# Patient Record
Sex: MALE | Race: WHITE | NOT HISPANIC OR LATINO | Employment: OTHER | URBAN - NONMETROPOLITAN AREA
[De-identification: names, ages, dates, MRNs, and addresses within clinical notes are randomized per-mention and may not be internally consistent; named-entity substitution may affect disease eponyms.]

---

## 2023-11-30 ENCOUNTER — TELEPHONE (OUTPATIENT)
Dept: CARDIOLOGY | Facility: CLINIC | Age: 73
End: 2023-11-30
Payer: MEDICARE

## 2023-11-30 NOTE — TELEPHONE ENCOUNTER
Patient called to schedule new patient visit with Dr. Clyde Dumont, DO to establish care.  Next opening given for 1/3/23.  Informed patient to use ER and PCP if issues arise.

## 2023-12-20 PROBLEM — M25.562 KNEE PAIN, BILATERAL: Status: ACTIVE | Noted: 2023-12-20

## 2023-12-20 PROBLEM — M17.11 RIGHT KNEE DJD: Status: ACTIVE | Noted: 2023-12-20

## 2023-12-20 PROBLEM — R09.82 POSTNASAL DRIP: Status: ACTIVE | Noted: 2023-12-20

## 2023-12-20 PROBLEM — J32.2 CHRONIC ETHMOIDAL SINUSITIS: Status: ACTIVE | Noted: 2023-12-20

## 2023-12-20 PROBLEM — J34.3 NASAL TURBINATE HYPERTROPHY: Status: ACTIVE | Noted: 2023-12-20

## 2023-12-20 PROBLEM — M25.561 KNEE PAIN, BILATERAL: Status: ACTIVE | Noted: 2023-12-20

## 2023-12-20 PROBLEM — M25.562 LEFT KNEE PAIN: Status: ACTIVE | Noted: 2023-12-20

## 2023-12-20 PROBLEM — M17.12 LEFT KNEE DJD: Status: ACTIVE | Noted: 2023-12-20

## 2023-12-20 PROBLEM — R09.81 NASAL CONGESTION: Status: ACTIVE | Noted: 2023-12-20

## 2023-12-20 RX ORDER — SITAGLIPTIN 100 MG/1
100 TABLET, FILM COATED ORAL
COMMUNITY
Start: 2019-10-16

## 2023-12-20 RX ORDER — LISINOPRIL 10 MG/1
10 TABLET ORAL
COMMUNITY
Start: 2019-08-08

## 2023-12-20 RX ORDER — METFORMIN HYDROCHLORIDE 500 MG/1
1000 TABLET ORAL
COMMUNITY
Start: 2020-04-01

## 2023-12-20 RX ORDER — ALBUTEROL SULFATE 4 MG/1
4 TABLET ORAL
COMMUNITY
Start: 2020-02-18 | End: 2024-01-03

## 2023-12-20 RX ORDER — GLIPIZIDE 5 MG/1
5 TABLET ORAL
COMMUNITY
Start: 2019-11-15 | End: 2024-01-03

## 2023-12-20 RX ORDER — ATORVASTATIN CALCIUM 40 MG/1
40 TABLET, FILM COATED ORAL NIGHTLY
COMMUNITY
Start: 2019-08-08

## 2023-12-20 RX ORDER — TRIAMTERENE AND HYDROCHLOROTHIAZIDE 37.5; 25 MG/1; MG/1
1 CAPSULE ORAL
COMMUNITY
Start: 2019-10-16

## 2023-12-20 RX ORDER — EMPAGLIFLOZIN 25 MG/1
25 TABLET, FILM COATED ORAL DAILY
COMMUNITY
Start: 2019-05-16

## 2023-12-20 RX ORDER — FLUTICASONE PROPIONATE 50 MCG
2 SPRAY, SUSPENSION (ML) NASAL AS NEEDED
COMMUNITY
Start: 2021-07-29

## 2024-01-03 ENCOUNTER — OFFICE VISIT (OUTPATIENT)
Dept: CARDIOLOGY | Facility: CLINIC | Age: 74
End: 2024-01-03
Payer: MEDICARE

## 2024-01-03 VITALS
HEIGHT: 70 IN | SYSTOLIC BLOOD PRESSURE: 122 MMHG | DIASTOLIC BLOOD PRESSURE: 84 MMHG | HEART RATE: 100 BPM | BODY MASS INDEX: 34.36 KG/M2 | WEIGHT: 240 LBS

## 2024-01-03 DIAGNOSIS — R00.0 SINUS TACHYCARDIA: ICD-10-CM

## 2024-01-03 DIAGNOSIS — I10 ESSENTIAL HYPERTENSION: ICD-10-CM

## 2024-01-03 DIAGNOSIS — E11.9 TYPE 2 DIABETES MELLITUS WITHOUT COMPLICATION, WITH LONG-TERM CURRENT USE OF INSULIN (MULTI): ICD-10-CM

## 2024-01-03 DIAGNOSIS — R07.9 CHEST PAIN, UNSPECIFIED TYPE: ICD-10-CM

## 2024-01-03 DIAGNOSIS — Z87.19 HISTORY OF INDIGESTION: ICD-10-CM

## 2024-01-03 DIAGNOSIS — E78.2 MIXED HYPERLIPIDEMIA: ICD-10-CM

## 2024-01-03 DIAGNOSIS — Z79.4 TYPE 2 DIABETES MELLITUS WITHOUT COMPLICATION, WITH LONG-TERM CURRENT USE OF INSULIN (MULTI): ICD-10-CM

## 2024-01-03 DIAGNOSIS — E66.9 OBESITY (BMI 30.0-34.9): ICD-10-CM

## 2024-01-03 PROBLEM — E66.811 OBESITY (BMI 30.0-34.9): Status: ACTIVE | Noted: 2024-01-03

## 2024-01-03 PROCEDURE — 1159F MED LIST DOCD IN RCRD: CPT | Performed by: INTERNAL MEDICINE

## 2024-01-03 PROCEDURE — 93000 ELECTROCARDIOGRAM COMPLETE: CPT | Performed by: INTERNAL MEDICINE

## 2024-01-03 PROCEDURE — 1036F TOBACCO NON-USER: CPT | Performed by: INTERNAL MEDICINE

## 2024-01-03 PROCEDURE — 4010F ACE/ARB THERAPY RXD/TAKEN: CPT | Performed by: INTERNAL MEDICINE

## 2024-01-03 PROCEDURE — 99204 OFFICE O/P NEW MOD 45 MIN: CPT | Performed by: INTERNAL MEDICINE

## 2024-01-03 PROCEDURE — 3074F SYST BP LT 130 MM HG: CPT | Performed by: INTERNAL MEDICINE

## 2024-01-03 PROCEDURE — 1160F RVW MEDS BY RX/DR IN RCRD: CPT | Performed by: INTERNAL MEDICINE

## 2024-01-03 PROCEDURE — 3078F DIAST BP <80 MM HG: CPT | Performed by: INTERNAL MEDICINE

## 2024-01-03 PROCEDURE — 1126F AMNT PAIN NOTED NONE PRSNT: CPT | Performed by: INTERNAL MEDICINE

## 2024-01-03 RX ORDER — GLIPIZIDE 10 MG/1
10 TABLET ORAL
COMMUNITY

## 2024-01-03 RX ORDER — TADALAFIL 20 MG/1
20 TABLET ORAL DAILY PRN
COMMUNITY
End: 2024-04-09 | Stop reason: ALTCHOICE

## 2024-01-03 RX ORDER — ASPIRIN 81 MG/1
81 TABLET ORAL DAILY
COMMUNITY

## 2024-01-03 ASSESSMENT — ENCOUNTER SYMPTOMS: DIZZINESS: 1

## 2024-01-03 NOTE — LETTER
January 3, 2024     Felipe Mcclellan MD  [address]  [place] [address]    Patient: Rico Raman   YOB: 1950   Date of Visit: 1/3/2024       Dear Dr. Felipe Mcclellan MD:    Thank you for referring Rico Raman to me for evaluation. Below are my notes for this consultation.  If you have questions, please do not hesitate to call me. I look forward to following your patient along with you.       Sincerely,     Clyde Dumont, DO      CC: No Recipients  ______________________________________________________________________________________    Cardiology Consultation- New Consult    Reason for referral: 73-year-old gentleman seen in cardiology consultation at the request of himself and Dr. Mcclellan for further evaluation for isolated episode of indigestion/chest discomfort approximately 2 weeks ago that lasted less than a day.  He has had none since.    He denies any previous history of myocardial infarction, revascularization, stroke, thromboembolic or bleeding disorder or hospitalizations.  He has not had any stress testing in the past he denies any syncope or passing out or arrhythmia.    His past medical history is noted for diabetes, hypertension, obesity, history of hip replacement, osteoarthritis of both knees.  In the past year he states he is lost approximately 88 pounds volitionally.    Today's ECG reveals sinus tachycardia rate of 100 but is otherwise normal    Recommendations, obtain lipid panel, counseling on dietary discretion, weight loss and exercise, obtain treadmill stress test if he is able to comply if not then will switch to Lexiscan stress, follow-up for report thereafterwards.    HPI: Rico Raman is a 73 y.o. male       Past Medical History:   1. Diabetes mellitus  2.  Essential hypertension  3.  Obesity  4.  Osteoarthritis  5.  Family history of peripheral vascular disease and diabetes and stroke    Surgical History:   He has a past surgical history that  "includes Hip surgery and Shoulder arthroscopy.    Family History:   Family History   Problem Relation Name Age of Onset   • Diabetes type II Mother     • Other (cancer of prostate) Father     • Skin cancer Sister     • Parkinsonism Brother     • Stroke Brother     • Seizures Brother         Social History:   Social History     Tobacco Use   • Smoking status: Never   • Smokeless tobacco: Never   Substance Use Topics   • Alcohol use: Yes     Comment: rare        Allergies:  Patient has no known allergies.     Current Medications:    Current Outpatient Medications:   •  aspirin 81 mg EC tablet, Take 1 tablet (81 mg) by mouth once daily., Disp: , Rfl:   •  atorvastatin (Lipitor) 40 mg tablet, Take 1 tablet (40 mg) by mouth once daily at bedtime., Disp: , Rfl:   •  fluticasone (Flonase) 50 mcg/actuation nasal spray, Administer 2 sprays into each nostril once daily., Disp: , Rfl:   •  glipiZIDE (Glucotrol) 10 mg tablet, Take 1 tablet (10 mg) by mouth 2 times a day before meals., Disp: , Rfl:   •  Januvia 100 mg tablet, Take 1 tablet (100 mg) by mouth., Disp: , Rfl:   •  Jardiance 25 mg, Take 1 tablet (25 mg) by mouth once daily., Disp: , Rfl:   •  lisinopril 10 mg tablet, Take 1 tablet (10 mg) by mouth., Disp: , Rfl:   •  metFORMIN (Glucophage) 500 mg tablet, Take 2 tablets (1,000 mg) by mouth 2 times a day with meals., Disp: , Rfl:   •  semaglutide (Rybelsus) 3 mg tablet, Take 1 tablet (3 mg) by mouth once daily., Disp: , Rfl:   •  tadalafil 20 mg tablet, Take 1 tablet (20 mg) by mouth once daily as needed for erectile dysfunction., Disp: , Rfl:   •  triamterene-hydrochlorothiazid (Dyazide) 37.5-25 mg capsule, Take 1 capsule by mouth., Disp: , Rfl:      Vitals:  Visit Vitals  /84 (BP Location: Left arm, Patient Position: Sitting)   Pulse 100   Ht 1.765 m (5' 9.5\")   Wt 109 kg (240 lb)   BMI 34.93 kg/m²   Smoking Status Never   BSA 2.31 m²         Review of Systems   Cardiovascular:  Positive for chest pain. "   Neurological:  Positive for dizziness.       Objective        Physical Exam  Constitutional:       Appearance: Normal appearance. He is normal weight.   HENT:      Nose: Nose normal.   Neck:      Vascular: No carotid bruit.   Cardiovascular:      Rate and Rhythm: Tachycardia present.      Pulses: Normal pulses.      Heart sounds: Normal heart sounds.   Pulmonary:      Effort: Pulmonary effort is normal.   Abdominal:      General: Bowel sounds are normal.      Palpations: Abdomen is soft.   Genitourinary:     Rectum: Normal.   Musculoskeletal:         General: Normal range of motion.      Cervical back: Normal range of motion.      Right lower leg: No edema.      Left lower leg: No edema.   Skin:     General: Skin is warm and dry.   Neurological:      General: No focal deficit present.      Mental Status: He is alert.   Psychiatric:         Mood and Affect: Mood normal.         Behavior: Behavior normal.         Thought Content: Thought content normal.         Judgment: Judgment normal.          Scribe Attestation  By signing my name below, DARREL Diana LULY RAMIREZ  , Scribe   attest that this documentation has been prepared under the direction and in the presence of Clyde Dumont DO.     EKG done in office today   Assessment and Plan:   1. Chest pain, unspecified type        2. Sinus tachycardia        3. Essential hypertension        4. Mixed hyperlipidemia        5. Type 2 diabetes mellitus without complication, with long-term current use of insulin (CMS/[facility])        6. History of indigestion        7. Obesity (BMI 30.0-34.9)

## 2024-01-03 NOTE — PROGRESS NOTES
Cardiology Consultation- New Consult    Reason for referral: 73-year-old gentleman seen in cardiology consultation at the request of himself and Dr. Mcclellan for further evaluation for isolated episode of indigestion/chest discomfort approximately 2 weeks ago that lasted less than a day.  He has had none since.    He denies any previous history of myocardial infarction, revascularization, stroke, thromboembolic or bleeding disorder or hospitalizations.  He has not had any stress testing in the past he denies any syncope or passing out or arrhythmia.    His past medical history is noted for diabetes, hypertension, obesity, history of hip replacement, osteoarthritis of both knees.  In the past year he states he is lost approximately 88 pounds volitionally.    Today's ECG reveals sinus tachycardia rate of 100 but is otherwise normal    Recommendations, obtain lipid panel, counseling on dietary discretion, weight loss and exercise, obtain treadmill stress test if he is able to comply if not then will switch to Lexiscan stress, follow-up for report thereafterwards.    HPI: Rico Raman is a 73 y.o. male       Past Medical History:   1. Diabetes mellitus  2.  Essential hypertension  3.  Obesity  4.  Osteoarthritis  5.  Family history of peripheral vascular disease and diabetes and stroke    Surgical History:   He has a past surgical history that includes Hip surgery and Shoulder arthroscopy.    Family History:   Family History   Problem Relation Name Age of Onset    Diabetes type II Mother      Other (cancer of prostate) Father      Skin cancer Sister      Parkinsonism Brother      Stroke Brother      Seizures Brother         Social History:   Social History     Tobacco Use    Smoking status: Never    Smokeless tobacco: Never   Substance Use Topics    Alcohol use: Yes     Comment: rare        Allergies:  Patient has no known allergies.     Current Medications:    Current Outpatient Medications:     aspirin 81 mg EC  "tablet, Take 1 tablet (81 mg) by mouth once daily., Disp: , Rfl:     atorvastatin (Lipitor) 40 mg tablet, Take 1 tablet (40 mg) by mouth once daily at bedtime., Disp: , Rfl:     fluticasone (Flonase) 50 mcg/actuation nasal spray, Administer 2 sprays into each nostril once daily., Disp: , Rfl:     glipiZIDE (Glucotrol) 10 mg tablet, Take 1 tablet (10 mg) by mouth 2 times a day before meals., Disp: , Rfl:     Januvia 100 mg tablet, Take 1 tablet (100 mg) by mouth., Disp: , Rfl:     Jardiance 25 mg, Take 1 tablet (25 mg) by mouth once daily., Disp: , Rfl:     lisinopril 10 mg tablet, Take 1 tablet (10 mg) by mouth., Disp: , Rfl:     metFORMIN (Glucophage) 500 mg tablet, Take 2 tablets (1,000 mg) by mouth 2 times a day with meals., Disp: , Rfl:     semaglutide (Rybelsus) 3 mg tablet, Take 1 tablet (3 mg) by mouth once daily., Disp: , Rfl:     tadalafil 20 mg tablet, Take 1 tablet (20 mg) by mouth once daily as needed for erectile dysfunction., Disp: , Rfl:     triamterene-hydrochlorothiazid (Dyazide) 37.5-25 mg capsule, Take 1 capsule by mouth., Disp: , Rfl:      Vitals:  Visit Vitals  /84 (BP Location: Left arm, Patient Position: Sitting)   Pulse 100   Ht 1.765 m (5' 9.5\")   Wt 109 kg (240 lb)   BMI 34.93 kg/m²   Smoking Status Never   BSA 2.31 m²         Review of Systems   Cardiovascular:  Positive for chest pain.   Neurological:  Positive for dizziness.       Objective         Physical Exam  Constitutional:       Appearance: Normal appearance. He is normal weight.   HENT:      Nose: Nose normal.   Neck:      Vascular: No carotid bruit.   Cardiovascular:      Rate and Rhythm: Tachycardia present.      Pulses: Normal pulses.      Heart sounds: Normal heart sounds.   Pulmonary:      Effort: Pulmonary effort is normal.   Abdominal:      General: Bowel sounds are normal.      Palpations: Abdomen is soft.   Genitourinary:     Rectum: Normal.   Musculoskeletal:         General: Normal range of motion.      Cervical " back: Normal range of motion.      Right lower leg: No edema.      Left lower leg: No edema.   Skin:     General: Skin is warm and dry.   Neurological:      General: No focal deficit present.      Mental Status: He is alert.   Psychiatric:         Mood and Affect: Mood normal.         Behavior: Behavior normal.         Thought Content: Thought content normal.         Judgment: Judgment normal.          Scribe Attestation  By signing my name below, DARREL Diana SANCHEZAmalia RAMIREZ  , Scribe   attest that this documentation has been prepared under the direction and in the presence of Clyde Dumont DO.     EKG done in office today   Assessment and Plan:   1. Chest pain, unspecified type        2. Sinus tachycardia        3. Essential hypertension        4. Mixed hyperlipidemia        5. Type 2 diabetes mellitus without complication, with long-term current use of insulin (CMS/[facility])        6. History of indigestion        7. Obesity (BMI 30.0-34.9)

## 2024-01-18 ENCOUNTER — HOSPITAL ENCOUNTER (OUTPATIENT)
Dept: CARDIOLOGY | Facility: CLINIC | Age: 74
Discharge: HOME | End: 2024-01-18
Payer: MEDICARE

## 2024-01-18 ENCOUNTER — APPOINTMENT (OUTPATIENT)
Dept: CARDIOLOGY | Facility: CLINIC | Age: 74
End: 2024-01-18
Payer: MEDICARE

## 2024-01-18 VITALS — DIASTOLIC BLOOD PRESSURE: 78 MMHG | HEART RATE: 107 BPM | SYSTOLIC BLOOD PRESSURE: 126 MMHG

## 2024-01-18 DIAGNOSIS — R07.9 CHEST PAIN, UNSPECIFIED TYPE: ICD-10-CM

## 2024-01-18 PROCEDURE — 93016 CV STRESS TEST SUPVJ ONLY: CPT | Performed by: INTERNAL MEDICINE

## 2024-01-18 PROCEDURE — 93018 CV STRESS TEST I&R ONLY: CPT | Performed by: INTERNAL MEDICINE

## 2024-01-18 PROCEDURE — 93017 CV STRESS TEST TRACING ONLY: CPT

## 2024-02-02 ENCOUNTER — TELEPHONE (OUTPATIENT)
Dept: CARDIOLOGY | Facility: CLINIC | Age: 74
End: 2024-02-02
Payer: MEDICARE

## 2024-04-09 ENCOUNTER — OFFICE VISIT (OUTPATIENT)
Dept: CARDIOLOGY | Facility: CLINIC | Age: 74
End: 2024-04-09
Payer: MEDICARE

## 2024-04-09 VITALS
WEIGHT: 243 LBS | HEIGHT: 70 IN | BODY MASS INDEX: 34.79 KG/M2 | DIASTOLIC BLOOD PRESSURE: 62 MMHG | SYSTOLIC BLOOD PRESSURE: 112 MMHG | HEART RATE: 86 BPM

## 2024-04-09 DIAGNOSIS — E11.9 TYPE 2 DIABETES MELLITUS WITHOUT COMPLICATION, WITH LONG-TERM CURRENT USE OF INSULIN (MULTI): ICD-10-CM

## 2024-04-09 DIAGNOSIS — I10 ESSENTIAL HYPERTENSION: ICD-10-CM

## 2024-04-09 DIAGNOSIS — Z79.4 TYPE 2 DIABETES MELLITUS WITHOUT COMPLICATION, WITH LONG-TERM CURRENT USE OF INSULIN (MULTI): ICD-10-CM

## 2024-04-09 DIAGNOSIS — Z71.2 ENCOUNTER TO DISCUSS TEST RESULTS: ICD-10-CM

## 2024-04-09 DIAGNOSIS — R07.9 CHEST PAIN, UNSPECIFIED TYPE: ICD-10-CM

## 2024-04-09 DIAGNOSIS — E78.2 MIXED HYPERLIPIDEMIA: ICD-10-CM

## 2024-04-09 PROBLEM — E66.811 OBESITY (BMI 30.0-34.9): Status: RESOLVED | Noted: 2024-01-03 | Resolved: 2024-04-09

## 2024-04-09 PROBLEM — E66.9 OBESITY (BMI 30.0-34.9): Status: RESOLVED | Noted: 2024-01-03 | Resolved: 2024-04-09

## 2024-04-09 PROCEDURE — 3008F BODY MASS INDEX DOCD: CPT | Performed by: INTERNAL MEDICINE

## 2024-04-09 PROCEDURE — 1159F MED LIST DOCD IN RCRD: CPT | Performed by: INTERNAL MEDICINE

## 2024-04-09 PROCEDURE — 99213 OFFICE O/P EST LOW 20 MIN: CPT | Performed by: INTERNAL MEDICINE

## 2024-04-09 PROCEDURE — 3074F SYST BP LT 130 MM HG: CPT | Performed by: INTERNAL MEDICINE

## 2024-04-09 PROCEDURE — 1160F RVW MEDS BY RX/DR IN RCRD: CPT | Performed by: INTERNAL MEDICINE

## 2024-04-09 PROCEDURE — 3078F DIAST BP <80 MM HG: CPT | Performed by: INTERNAL MEDICINE

## 2024-04-09 PROCEDURE — 4010F ACE/ARB THERAPY RXD/TAKEN: CPT | Performed by: INTERNAL MEDICINE

## 2024-04-09 PROCEDURE — 1036F TOBACCO NON-USER: CPT | Performed by: INTERNAL MEDICINE

## 2024-04-09 RX ORDER — INSULIN GLARGINE 100 [IU]/ML
70 INJECTION, SOLUTION SUBCUTANEOUS EVERY 24 HOURS
COMMUNITY

## 2024-04-09 ASSESSMENT — ENCOUNTER SYMPTOMS: VERTIGO: 1

## 2024-04-09 NOTE — PATIENT INSTRUCTIONS
Please bring all medicines, vitamins, and herbal supplements with you when you come to the office.    Prescriptions will not be filled unless you are compliant with your follow up appointments or have a follow up appointment scheduled as per instruction of your physician. Refills should be requested at the time of your visit.     BMI was above normal measurement. Current weight: 110 kg (243 lb)  Weight change since last visit (-) denotes wt loss 3 lbs   Weight loss needed to achieve BMI 25: 71.6 Lbs  Weight loss needed to achieve BMI 30: 37.3 Lbs  Provided instructions on dietary changes  Provided instructions on exercise.

## 2024-04-09 NOTE — LETTER
"April 9, 2024     Felipe Mcclellan MD  [address]  [facility] [address]    Patient: Rico Raman   YOB: 1950   Date of Visit: 4/9/2024       Dear Dr. Felipe Mcclellan MD:    Thank you for referring Rico Raman to me for evaluation. Below are my notes for this consultation.  If you have questions, please do not hesitate to call me. I look forward to following your patient along with you.       Sincerely,     Clyde Dumont, DO      CC: No Recipients  ______________________________________________________________________________________    Subjective   Rico Raman is a 73 y.o. male       Chief Complaint    Results          73-year-old gentleman returns for follow-up he is doing well he denies any cardiovascular events, complaints or nitrate usage or hospitalizations.  He underwent recent treadmill stress testing on Pepito protocol up to 7 METS without any ischemic changes.    He has underlying diabetes, controlled hypertension hyperlipidemia (no recent lipid assessment available) and mildly overweight.  Family history in father and brother for strokes.    Again no obvious ischemic changes or symptoms on recent treadmill stress testing.    Will obtain a lipid panel from his family physician otherwise follow-up again on same therapies in 1 year for primary prevention.         Review of Systems   Neurological:  Positive for vertigo.   All other systems reviewed and are negative.           Vitals:    04/09/24 0948   BP: 112/62   BP Location: Right arm   Patient Position: Sitting   Pulse: 86   Weight: 110 kg (243 lb)   Height: 1.765 m (5' 9.5\")        Objective   Physical Exam  Constitutional:       Appearance: Normal appearance.   HENT:      Nose: Nose normal.   Neck:      Vascular: No carotid bruit.   Cardiovascular:      Rate and Rhythm: Normal rate.      Pulses: Normal pulses.      Heart sounds: Normal heart sounds.   Pulmonary:      Effort: Pulmonary effort is normal. "   Abdominal:      General: Bowel sounds are normal.      Palpations: Abdomen is soft.   Musculoskeletal:         General: Normal range of motion.      Cervical back: Normal range of motion.      Right lower leg: No edema.      Left lower leg: No edema.   Skin:     General: Skin is warm and dry.   Neurological:      General: No focal deficit present.      Mental Status: He is alert.   Psychiatric:         Mood and Affect: Mood normal.         Behavior: Behavior normal.         Thought Content: Thought content normal.         Judgment: Judgment normal.         Allergies  Patient has no known allergies.     Current Medications    Current Outpatient Medications:   •  aspirin 81 mg EC tablet, Take 1 tablet (81 mg) by mouth once daily., Disp: , Rfl:   •  atorvastatin (Lipitor) 40 mg tablet, Take 1 tablet (40 mg) by mouth once daily at bedtime., Disp: , Rfl:   •  fluticasone (Flonase) 50 mcg/actuation nasal spray, Administer 2 sprays into each nostril if needed., Disp: , Rfl:   •  glipiZIDE (Glucotrol) 10 mg tablet, Take 1 tablet (10 mg) by mouth 2 times a day before meals., Disp: , Rfl:   •  insulin glargine (Lantus U-100 Insulin) 100 unit/mL injection, Inject 70 Units under the skin once every 24 hours. Take as directed per insulin instructions., Disp: , Rfl:   •  Januvia 100 mg tablet, Take 1 tablet (100 mg) by mouth., Disp: , Rfl:   •  Jardiance 25 mg, Take 1 tablet (25 mg) by mouth once daily., Disp: , Rfl:   •  lisinopril 10 mg tablet, Take 1 tablet (10 mg) by mouth., Disp: , Rfl:   •  metFORMIN (Glucophage) 500 mg tablet, Take 2 tablets (1,000 mg) by mouth 2 times a day with meals., Disp: , Rfl:   •  triamterene-hydrochlorothiazid (Dyazide) 37.5-25 mg capsule, Take 1 capsule by mouth., Disp: , Rfl:                      Assessment/Plan   1. Encounter to discuss test results        2. Chest pain, unspecified type  Follow Up In Cardiology      3. Essential hypertension        4. Mixed hyperlipidemia        5. Type 2  diabetes mellitus without complication, with long-term current use of insulin (CMS/[facility])        6. BMI 35.0-35.9,adult                 Scribe Attestation  By signing my name below, I, Diana MAURICIO LPN  , Scribe   attest that this documentation has been prepared under the direction and in the presence of Clyde Dumont DO.     Provider Attestation - Scribe documentation    All medical record entries made by the Scribe were at my direction and personally dictated by me. I have reviewed the chart and agree that the record accurately reflects my personal performance of the history, physical exam, discussion and plan.

## 2024-04-09 NOTE — PROGRESS NOTES
"Subjective   Rico Raman is a 73 y.o. male       Chief Complaint    Results          73-year-old gentleman returns for follow-up he is doing well he denies any cardiovascular events, complaints or nitrate usage or hospitalizations.  He underwent recent treadmill stress testing on Pepito protocol up to 7 METS without any ischemic changes.    He has underlying diabetes, controlled hypertension hyperlipidemia (no recent lipid assessment available) and mildly overweight.  Family history in father and brother for strokes.    Again no obvious ischemic changes or symptoms on recent treadmill stress testing.    Will obtain a lipid panel from his family physician otherwise follow-up again on same therapies in 1 year for primary prevention.         Review of Systems   Neurological:  Positive for vertigo.   All other systems reviewed and are negative.           Vitals:    04/09/24 0948   BP: 112/62   BP Location: Right arm   Patient Position: Sitting   Pulse: 86   Weight: 110 kg (243 lb)   Height: 1.765 m (5' 9.5\")        Objective   Physical Exam  Constitutional:       Appearance: Normal appearance.   HENT:      Nose: Nose normal.   Neck:      Vascular: No carotid bruit.   Cardiovascular:      Rate and Rhythm: Normal rate.      Pulses: Normal pulses.      Heart sounds: Normal heart sounds.   Pulmonary:      Effort: Pulmonary effort is normal.   Abdominal:      General: Bowel sounds are normal.      Palpations: Abdomen is soft.   Musculoskeletal:         General: Normal range of motion.      Cervical back: Normal range of motion.      Right lower leg: No edema.      Left lower leg: No edema.   Skin:     General: Skin is warm and dry.   Neurological:      General: No focal deficit present.      Mental Status: He is alert.   Psychiatric:         Mood and Affect: Mood normal.         Behavior: Behavior normal.         Thought Content: Thought content normal.         Judgment: Judgment normal.         Allergies  Patient has no " known allergies.     Current Medications    Current Outpatient Medications:     aspirin 81 mg EC tablet, Take 1 tablet (81 mg) by mouth once daily., Disp: , Rfl:     atorvastatin (Lipitor) 40 mg tablet, Take 1 tablet (40 mg) by mouth once daily at bedtime., Disp: , Rfl:     fluticasone (Flonase) 50 mcg/actuation nasal spray, Administer 2 sprays into each nostril if needed., Disp: , Rfl:     glipiZIDE (Glucotrol) 10 mg tablet, Take 1 tablet (10 mg) by mouth 2 times a day before meals., Disp: , Rfl:     insulin glargine (Lantus U-100 Insulin) 100 unit/mL injection, Inject 70 Units under the skin once every 24 hours. Take as directed per insulin instructions., Disp: , Rfl:     Januvia 100 mg tablet, Take 1 tablet (100 mg) by mouth., Disp: , Rfl:     Jardiance 25 mg, Take 1 tablet (25 mg) by mouth once daily., Disp: , Rfl:     lisinopril 10 mg tablet, Take 1 tablet (10 mg) by mouth., Disp: , Rfl:     metFORMIN (Glucophage) 500 mg tablet, Take 2 tablets (1,000 mg) by mouth 2 times a day with meals., Disp: , Rfl:     triamterene-hydrochlorothiazid (Dyazide) 37.5-25 mg capsule, Take 1 capsule by mouth., Disp: , Rfl:                      Assessment/Plan   1. Encounter to discuss test results        2. Chest pain, unspecified type  Follow Up In Cardiology      3. Essential hypertension        4. Mixed hyperlipidemia        5. Type 2 diabetes mellitus without complication, with long-term current use of insulin (CMS/[facility])        6. BMI 35.0-35.9,adult                 Scribe Attestation  By signing my name below, I, Diana MAURICIO LPN  , Scribe   attest that this documentation has been prepared under the direction and in the presence of Clyde Dumont DO.     Provider Attestation - Scribe documentation    All medical record entries made by the Scribe were at my direction and personally dictated by me. I have reviewed the chart and agree that the record accurately reflects my personal performance of the history, physical exam,  discussion and plan.

## 2025-04-10 ENCOUNTER — APPOINTMENT (OUTPATIENT)
Dept: CARDIOLOGY | Facility: CLINIC | Age: 75
End: 2025-04-10
Payer: MEDICARE

## 2025-05-01 ENCOUNTER — TELEPHONE (OUTPATIENT)
Dept: ALLERGY | Facility: CLINIC | Age: 75
End: 2025-05-01
Payer: MEDICARE

## 2025-06-11 PROBLEM — R09.82 POSTNASAL DRIP: Status: RESOLVED | Noted: 2023-12-20 | Resolved: 2025-06-11

## 2025-06-12 NOTE — PROGRESS NOTES
"  Subjective   Patient ID:   [id number]   Rico Raman is a 74 y.o. male who presents for Allergies.    Chief Complaint   Patient presents with    Allergies      This is a new patient, with H/O T2DM,  HLD, HTN, reflux, referred by Sol Bhat NP from [facility], for evaluation of chronic rhinitis.  Patient is accompanied by his wife.    Patient reports he believes he has allergies of some sort.  He was tested years ago  and saw Dr. Romero who Dxd patient with chronic rhinosinusitis.  He had resp allergy profiles 5-1-25 and 7-29-21 and both were negative.  He states his Sx typically start at night and including itchy, scratchy and eyes glued shut, nasal congestion and after lying down, cannot sleep due to congestion.  However, he notes he can breathe when he is upright.  He is not sure if this is congestion because he can get air in his nose but still feels like he is suffocating.  He is constantly spitting up clear, sometimes green, phlegm that clears after he drinks hot coffee in the morning.      Patient has tried Aller-Ortega and Astepro PRN, which help but Sx persist.  He does not like taking his medications too much.  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   He has no animals.    Patient states he notes dysphagia after eating.  His wife states they have switched detergents with no improvement.  His wife has noticed apnea but he has never had a sleep evaluation.    Patient saw a cardiologist due to difficulty breathing but exam was normal for any cardiac etiology.    Patient and wife's daughter is becoming a Nurse Practitioner.    Review of Systems   HENT:  Positive for congestion and postnasal drip. Negative for rhinorrhea.    Eyes:  Positive for discharge and itching.   Respiratory:  Positive for apnea (witnessed by wife).      Objective   /68   Ht 1.765 m (5' 9.5\")   Wt 112 kg (247 lb)   BMI 35.95 kg/m²      Physical Exam  Constitutional:       Appearance: Normal appearance.   HENT:    " 31 y.o.  EDC 25 EGA 40.1 here to LDA1 with FOB Teo, expecting a baby girl, c/o UC's q 4-5 minutes.  GBS negative.   EFM & TOCO applied, SVE /-3/vertex. Report to Dr. Leon & Adrien. Admit order received. Report to Lily Elias RN. Transferred to L&D room 215 for L&D.      Head: Normocephalic and atraumatic.      Right Ear: External ear normal. There is no impacted cerumen.      Left Ear: External ear normal. There is no impacted cerumen.      Nose: Congestion (bilateral nasal turbinate edema) present. No rhinorrhea.   Eyes:      Extraocular Movements: Extraocular movements intact.      Conjunctiva/sclera: Conjunctivae normal.      Pupils: Pupils are equal, round, and reactive to light.   Cardiovascular:      Rate and Rhythm: Normal rate and regular rhythm.      Heart sounds: No murmur heard.     No friction rub. No gallop.   Pulmonary:      Effort: No respiratory distress.      Breath sounds: No wheezing, rhonchi or rales.   Skin:     General: Skin is warm and dry.   Neurological:      Mental Status: He is alert.   Psychiatric:         Mood and Affect: Mood normal.         Behavior: Behavior normal.     Assessment/Plan   Allergy testing was performed on Rico Raman using standard technique. There were no immediate complications.    Test Results  Panel 1  1.   Histamine: 3 x 3  2.   Saline - Diluent: 0  3.   Cockroach: 0  4.   Cotton Linters: 0  5.   Cat: 0  6.   Do  7.   D. Farinae: 0  8.   D. Pter: 0  9.   Feather: 0  10. Alternaria: 0  Tree Panel  1.   Antoine: 0  2.   Beech: 0  3.   Birch: 0  4.   LaGrange: 0  5.   Silver Maple: 0  6.   Hickory: 0  7.   Maple: 0  8.   Oak: 0  9.   Avis: 0  10. Osteen: 0  Grass/Misc Tree  1.   Bermuda: 0  2.   K[place] Blue: 0  3.   Cuogn: 0  4.   Brent: 0  5.   Orchard: 0  6.   Red Top: 0  7.   Rye Grass: 0  8.   Sweet Vernal: 0  9.   Black [place]: 0  10. Hayden: 0  Weeds  1.   Cocklebur: 0  2.   Common Mugwort: 0  3.   English Plantain: 0  4.   Hemp: 0  5.   Goldenrod: 0  6.   Kochia: 0  7.   Lamb's Quarter: 0  8.   Monzon Elder: 0  9.   Pigweed: 0  10. Ragweed: 0  Molds  1.   Aspergillus Niger: 0  2.   Aureobasid: 0  3.   Bipolaris: 0  4.   Cladosporidium: 0  5.   Epicoccum: 0  6.   Fusarium: 0  7.   Geotrichum: 0  8.    Helminthosporium: 0  9.   Penicillium: 0  10. Phoma: 0  Animal  1.   Cow: 0  2.   Gerbil: 0  3.   Goat: 0  4.   [place] Pi  5.   Hamster: 0  6.   Horse: 0  7.   Mosquito: 0  8.   Mouse: 0  9.   Rabbit: 0  10. Rat: 0    Intradermal allergy testing was performed on Rico Raman using standard technique. There were no immediate complications.    Test Results  Controls  Intradermal Saline: 0  Intradermal Histamine: 10 x 10  ID  Cat: 0  Cockroach: 0  Common Weed Mix: 0  Cotton: 0  Do  Feather: 0  KORT Mix: 0  Mite Mix: 0  Mold Mix #1: 0  Mold Mix #2: 0  Ragweed: 0  Tree Mix: 0    Skin testing is negative.    Witnessed episode of apnea  Patient's wife has witnessed apneic events.  She uses a CPAP herself.      I would like him to undergo a home sleep study.      Chronic rhinitis  Patient was allergy tested and Dxd with rhinosinusitis by Dr. Romero.  Resp allergy profiles 25 and 21 were both negative.  He C/O nightly itchy/scratchy eyes that are glued shut on waking, nasal congestion and while supine, unable to sleep.  He can get air in his nose but still feels like he is suffocating.  He chronically spits up clear and sometimes green phlegm that clears in the morning.      Exam shows bilateral nasal turbinate edema.    Skin testing is negative.      He will try using Astepro every night before bedtime.  If he has no improvement after 2 weeks, I asked him to message me and a CT sinuses will be ordered.    MDM     Amount and/or Complexity of Data Reviewed  Tests in the radiology section of CPT®: ordered  Tests in the medicine section of CPT®: ordered  Obtain history from someone other than the patient: yes  Review and summarize past medical records: yes    Risk of Complications, Morbidity, and/or Mortality  Presenting problems: moderate  Diagnostic procedures: high  Management options: moderate        By signing my name below, IJeniffer Scribe, attest that this documentation has been prepared  under the direction and in the presence of Patricia Paniagua MD.  All medical record entries made by the Scribe were at my direction and personally dictated by me. I have reviewed the chart and agree that the record accurately reflects my personal performance of the history, physical exam, discussion and plan.

## 2025-06-17 ENCOUNTER — APPOINTMENT (OUTPATIENT)
Dept: ALLERGY | Facility: CLINIC | Age: 75
End: 2025-06-17
Payer: MEDICARE

## 2025-06-17 VITALS
BODY MASS INDEX: 35.36 KG/M2 | HEIGHT: 70 IN | SYSTOLIC BLOOD PRESSURE: 138 MMHG | DIASTOLIC BLOOD PRESSURE: 68 MMHG | WEIGHT: 247 LBS

## 2025-06-17 DIAGNOSIS — J31.0 CHRONIC RHINITIS: ICD-10-CM

## 2025-06-17 DIAGNOSIS — R06.81 WITNESSED EPISODE OF APNEA: Primary | ICD-10-CM

## 2025-06-17 PROCEDURE — 95004 PERQ TESTS W/ALRGNC XTRCS: CPT | Performed by: ALLERGY & IMMUNOLOGY

## 2025-06-17 PROCEDURE — 95024 IQ TESTS W/ALLERGENIC XTRCS: CPT | Performed by: ALLERGY & IMMUNOLOGY

## 2025-06-17 PROCEDURE — 99204 OFFICE O/P NEW MOD 45 MIN: CPT | Performed by: ALLERGY & IMMUNOLOGY

## 2025-06-17 ASSESSMENT — ENCOUNTER SYMPTOMS
APNEA: 1
RHINORRHEA: 0
EYE ITCHING: 1
EYE DISCHARGE: 1

## 2025-06-17 NOTE — ASSESSMENT & PLAN NOTE
Patient's wife has witnessed apneic events.  She uses a CPAP herself.      I would like him to undergo a home sleep study.

## 2025-06-17 NOTE — ASSESSMENT & PLAN NOTE
"Patient was allergy tested and Dxd with rhinosinusitis by Dr. Romero.  Resp allergy profiles 5-1-25 and 7-29-21 were both negative.  He C/O nightly itchy/scratchy eyes that are glued shut on waking, nasal congestion and while supine, unable to sleep.  He can get air in his nose but still feels like he is suffocating.  He chronically spits up clear and sometimes green phlegm that clears in the morning.      Exam shows bilateral nasal turbinate edema.    Skin testing is negative.  I believe he most likely has Vasomotor rhinitis    Vasomotor Rhinitis is chronic rhinitis that is characterized by intermittent (coming and going) episodes of sneezing, watery nasal drainage (rhinorrhea), and blood vessel congestion of the nasal mucus membranes. There appears to be a hypersensitive response to stimuli such as a dry atmosphere, air pollutants, spicy foods, alcohol, strong emotions, and some medications. Indeed, any particulate matter in the air, including pollens, dust, mold, or animal dander can bother people with VMR, even though they are not actually allergic to these things.      People with VMR are unusually sensitive to irritation and will have significant nasal symptoms, even when exposed to low concentrations of irritants. Thus, vasomotor rhinitis seems to be an exaggeration of the normal nasal response to irritation, occurring at levels of exposure, which doesn't bother most people.      Subjects with vasomotor rhinitis fall into two general groups: \"runners\" who have \"wet\" rhinorrhea, and \"dry\" subjects with predominant symptoms of nasal congestion and blockage to airflow and minimal rhinorrhea. These reactions can be provoked by non-specific irritant stimuli such as cold dry air, perfumes, paint fumes, and cigarette smoke. Subjects with predominantly rhinorrhea (sometimes referred to as cholinergic rhinitis) appear to have enhanced cholinergic glandular secretory activity, since atropine effectively reduces their " secretions.      It is important to understand that VMR is a nonspecific response to virtually any change or impurity in the air, as opposed to allergic rhinitis (or hay fever), which involves a response to a specific protein in pollen, dust, mold, or animal dander.      He will try using Astepro every night before bedtime.  If he has no improvement after 2 weeks, I asked him to message me and a CT sinuses will be ordered.

## 2025-06-17 NOTE — PATIENT INSTRUCTIONS
Skin testing is negative.    Use over the counter Astepro one spray each side one time a day.  To increase the efficacy of your nasal spray, be sure to look down while using it.?  Spray slightly away from the direction of your nasal septum (the bone in the middle of your nose) and only sniff after you have sprayed - avoid spraying and sniffing at the same time, or else a lot of spray will go down your throat.     If after 2 weeks you have no improvement, let me know so I can schedule a CT of your sinuses.  I will follow up with you with results and further recommendations.     Schedule at home sleep study.

## 2025-06-17 NOTE — LETTER
June 17, 2025     Felipe Mcclellan MD  [address]  [facility] [address]    Patient: Rico Raman   YOB: 1950   Date of Visit: 6/17/2025       Dear Dr. Felipe Mcclellan MD:    Thank you for referring Rico Raman to me for evaluation. Below are my notes for this consultation.  If you have questions, please do not hesitate to call me. I look forward to following your patient along with you.       Sincerely,     Patricia Paniagua MD      CC: Sol Bhat, APRN-CNP at [facility]  ______________________________________________________________________________________      Subjective   Patient ID:   [id number]   Rico Raman is a 74 y.o. male who presents for No chief complaint on file..    No chief complaint on file.     This is a new patient, with H/O T2DM,  HLD, HTN, reflux, referred by Sol Bhat NP from [facility], for evaluation of chronic rhinitis.    Patient reports     Per Dr. Romero, 5-1-25 and 7-29-21 Resp allergy profiles were negative.    Review of Systems    Objective   There were no vitals taken for this visit.     Physical Exam  Constitutional:       Appearance: Normal appearance.   HENT:      Head: Normocephalic and atraumatic.      Right Ear: External ear normal. There is no impacted cerumen.      Left Ear: External ear normal. There is no impacted cerumen.      Nose: No congestion or rhinorrhea.   Eyes:      Extraocular Movements: Extraocular movements intact.      Conjunctiva/sclera: Conjunctivae normal.      Pupils: Pupils are equal, round, and reactive to light.   Cardiovascular:      Rate and Rhythm: Normal rate and regular rhythm.      Heart sounds: No murmur heard.     No friction rub. No gallop.   Pulmonary:      Effort: No respiratory distress.      Breath sounds: No wheezing, rhonchi or rales.   Skin:     General: Skin is warm and dry.   Neurological:      Mental Status: He is alert.   Psychiatric:         Mood and Affect: Mood normal.          Behavior: Behavior normal.       Assessment/Plan   {Assess/PlanSmartLinks:94189}  No problem-specific Assessment & Plan notes found for this encounter.    By signing my name below, I, Fredis Ford, attest that this documentation has been prepared under the direction and in the presence of Patricia Paniagua MD.  All medical record entries made by the Scribe were at my direction and personally dictated by me. I have reviewed the chart and agree that the record accurately reflects my personal performance of the history, physical exam, discussion and plan.

## 2025-07-13 ENCOUNTER — CLINICAL SUPPORT (OUTPATIENT)
Dept: SLEEP MEDICINE | Facility: HOSPITAL | Age: 75
End: 2025-07-13
Payer: MEDICARE

## 2025-07-13 DIAGNOSIS — R06.81 WITNESSED EPISODE OF APNEA: ICD-10-CM

## 2025-07-13 PROCEDURE — 95806 SLEEP STUDY UNATT&RESP EFFT: CPT | Performed by: HOSPITALIST

## 2025-07-16 ENCOUNTER — OFFICE VISIT (OUTPATIENT)
Facility: CLINIC | Age: 75
End: 2025-07-16
Payer: MEDICARE

## 2025-07-16 VITALS
HEIGHT: 70 IN | SYSTOLIC BLOOD PRESSURE: 113 MMHG | BODY MASS INDEX: 35.36 KG/M2 | DIASTOLIC BLOOD PRESSURE: 78 MMHG | WEIGHT: 247 LBS | TEMPERATURE: 97.4 F

## 2025-07-16 DIAGNOSIS — R09.82 POSTNASAL DRIP: ICD-10-CM

## 2025-07-16 DIAGNOSIS — J30.9 CHRONIC ALLERGIC RHINITIS: Primary | ICD-10-CM

## 2025-07-16 PROCEDURE — 1160F RVW MEDS BY RX/DR IN RCRD: CPT | Performed by: OTOLARYNGOLOGY

## 2025-07-16 PROCEDURE — 4010F ACE/ARB THERAPY RXD/TAKEN: CPT | Performed by: OTOLARYNGOLOGY

## 2025-07-16 PROCEDURE — 3074F SYST BP LT 130 MM HG: CPT | Performed by: OTOLARYNGOLOGY

## 2025-07-16 PROCEDURE — 3078F DIAST BP <80 MM HG: CPT | Performed by: OTOLARYNGOLOGY

## 2025-07-16 PROCEDURE — 1159F MED LIST DOCD IN RCRD: CPT | Performed by: OTOLARYNGOLOGY

## 2025-07-16 PROCEDURE — 3008F BODY MASS INDEX DOCD: CPT | Performed by: OTOLARYNGOLOGY

## 2025-07-16 PROCEDURE — 1036F TOBACCO NON-USER: CPT | Performed by: OTOLARYNGOLOGY

## 2025-07-16 PROCEDURE — 99204 OFFICE O/P NEW MOD 45 MIN: CPT | Performed by: OTOLARYNGOLOGY

## 2025-07-16 RX ORDER — FLUTICASONE PROPIONATE 50 MCG
2 SPRAY, SUSPENSION (ML) NASAL DAILY
Qty: 48 G | Refills: 3 | Status: SHIPPED | OUTPATIENT
Start: 2025-07-16 | End: 2026-07-16

## 2025-07-16 RX ORDER — ORAL SEMAGLUTIDE 14 MG/1
TABLET ORAL
COMMUNITY
Start: 2025-07-06

## 2025-07-16 RX ORDER — INSULIN LISPRO 200 [IU]/ML
INJECTION, SOLUTION SUBCUTANEOUS
COMMUNITY

## 2025-07-16 NOTE — PROGRESS NOTES
"Impression:  1. Chronic allergic rhinitis  fluticasone (Flonase) 50 mcg/actuation nasal spray      2. Postnasal drip             RECOMMENDATIONS/PLAN :  I reassured the patient there is no evidence of any infectious drainage however his nose does appear allergic.  He will go ahead and stop the Astepro for now and we will start him on Flonase nasal spray-2 puffs each nostril daily for the next 2 months.  I also want him to start flushing his nose using saline rinses to help remove any mucus.  He will call over the next few months and let us know how he is doing.      **This electronic medical record note was created with the use of voice recognition software.  Despite proofreading, typographical or grammatical errors may be present that could affect meaning of content **    Subjective   Patient ID:     Rico Raman is a 74 y.o. male who presents to the office today complaining of persistent nasal congestion that seems to worsen at night.  In the past he has used Astepro nasal spray but he was not using it every single day.  He is not rinsing his nose with saline.  He denies any recent fever chills or pus draining from the sinuses.  He does have postnasal drip.    ROS:  A detailed 12 system review of systems is noted on the intake form has been reviewed with the patient with details noted in the HPI and scanned into the patient's medical record.    Objective     Medical History[1]     Surgical History[2]     RX Allergies[3]     Current Medications[4]     Tobacco Use: Low Risk  (7/16/2025)    Patient History     Smoking Tobacco Use: Never     Smokeless Tobacco Use: Never     Passive Exposure: Not on file        Alcohol Use: Not on file        Social History     Substance and Sexual Activity   Drug Use Never        Physical Exam:  Visit Vitals  /78   Temp 36.3 °C (97.4 °F) (Temporal)   Ht 1.765 m (5' 9.5\")   Wt 112 kg (247 lb)   BMI 35.95 kg/m²   Smoking Status Never   BSA 2.34 m²      General: Patient is " alert, oriented, cooperative in no apparent distress.  Head: Normocephalic, atraumatic.  Eyes: PERRL, EOMI, Conjunctiva is clear. No nystagmus.  Ears: Right Ear-- Pinna is normal.  External auditory canal is patent. Tympanic membrane is [intact, translucent and has good mobility with my pneumatic otoscope. No effusion].  Mastoid is nontender.  Left ear-- Pinna is normal.  External auditory canal is patent. Tympanic membrane is [intact, translucent and has good mobility with my pneumatic otoscope.  No effusion].  Mastoid is nontender.  Nose: Septum is straight.  No septal perforation or lesions. No septal hematoma/ seroma.  No signs of bleeding.  Inferior turbinates are moderately swollen and pale.   No evidence of intranasal polyps.  No infectious drainage.  Throat:  Floor of mouth is clear, no masses.  Tongue appears normal, no lesions or masses. Gums, gingiva, buccal mucosa appear pink and moist, no lesions. Teeth are in fair repair.  No obvious dental infections.  Peritonsillar regions appear symmetric without swelling.  Hard and soft palate appear normal, no obvious cleft. Uvula is midline.  Oropharynx: No lesions. Retropharyngeal wall is flat.  Minimal clear postnasal drip.  Neck: Supple,  no lymphadenopathy.  No masses.  Salivary Glands: Symmetric bilaterally.  No palpable masses.  No evidence of acute infection or salivary stones  Neurologic: Cranial Nerves 2-12 are grossly intact without focal deficits. Cerebellar function testing is normal.     Results:   []    Procedure:   []    Mandeep R Alexus, DO        [1] History reviewed. No pertinent past medical history.  [2]   Past Surgical History:  Procedure Laterality Date    HIP SURGERY      SHOULDER ARTHROSCOPY     [3] No Known Allergies  [4]   Current Outpatient Medications:     aspirin 81 mg EC tablet, Take 1 tablet (81 mg) by mouth once daily., Disp: , Rfl:     atorvastatin (Lipitor) 40 mg tablet, Take 1 tablet (40 mg) by mouth once daily at bedtime., Disp: ,  Rfl:     glipiZIDE (Glucotrol) 10 mg tablet, Take 1 tablet (10 mg) by mouth 2 times a day before meals., Disp: , Rfl:     insulin glargine (Lantus U-100 Insulin) 100 unit/mL injection, Inject 70 Units under the skin once every 24 hours. Take as directed per insulin instructions., Disp: , Rfl:     insulin lispro (HumaLOG KwikPen Insulin) 200 unit/mL (3 mL) insulin pen pen, Inject under the skin 3 times daily (morning, midday, late afternoon). Take as directed per insulin instructions., Disp: , Rfl:     Januvia 100 mg tablet, Take 1 tablet (100 mg) by mouth., Disp: , Rfl:     Jardiance 25 mg, Take 1 tablet (25 mg) by mouth once daily., Disp: , Rfl:     lisinopril 10 mg tablet, Take 1 tablet (10 mg) by mouth., Disp: , Rfl:     metFORMIN (Glucophage) 500 mg tablet, Take 2 tablets (1,000 mg) by mouth 2 times daily (morning and late afternoon)., Disp: , Rfl:     ribavirin (REBETOL ORAL), Take by mouth., Disp: , Rfl:     Rybelsus 14 mg tablet tablet, , Disp: , Rfl:     triamterene-hydrochlorothiazid (Dyazide) 37.5-25 mg capsule, Take 1 capsule by mouth., Disp: , Rfl:     fluticasone (Flonase) 50 mcg/actuation nasal spray, Administer 2 sprays into each nostril once daily., Disp: 48 g, Rfl: 3

## 2025-07-21 ENCOUNTER — APPOINTMENT (OUTPATIENT)
Facility: CLINIC | Age: 75
End: 2025-07-21
Payer: MEDICARE

## 2025-07-29 ENCOUNTER — APPOINTMENT (OUTPATIENT)
Facility: CLINIC | Age: 75
End: 2025-07-29
Payer: MEDICARE

## 2025-07-29 DIAGNOSIS — G47.33 OSA (OBSTRUCTIVE SLEEP APNEA): Primary | ICD-10-CM

## 2025-08-06 ENCOUNTER — OFFICE VISIT (OUTPATIENT)
Dept: CARDIOLOGY | Facility: CLINIC | Age: 75
End: 2025-08-06
Payer: MEDICARE

## 2025-08-06 VITALS
DIASTOLIC BLOOD PRESSURE: 60 MMHG | HEART RATE: 84 BPM | WEIGHT: 253 LBS | SYSTOLIC BLOOD PRESSURE: 116 MMHG | HEIGHT: 70 IN | BODY MASS INDEX: 36.22 KG/M2

## 2025-08-06 DIAGNOSIS — Z79.4 TYPE 2 DIABETES MELLITUS WITHOUT COMPLICATION, WITH LONG-TERM CURRENT USE OF INSULIN: ICD-10-CM

## 2025-08-06 DIAGNOSIS — Z78.9 NEVER SMOKED TOBACCO: ICD-10-CM

## 2025-08-06 DIAGNOSIS — E11.9 TYPE 2 DIABETES MELLITUS WITHOUT COMPLICATION, WITH LONG-TERM CURRENT USE OF INSULIN: ICD-10-CM

## 2025-08-06 DIAGNOSIS — G47.33 OSA (OBSTRUCTIVE SLEEP APNEA): ICD-10-CM

## 2025-08-06 DIAGNOSIS — I10 ESSENTIAL HYPERTENSION: ICD-10-CM

## 2025-08-06 DIAGNOSIS — E78.2 MIXED HYPERLIPIDEMIA: ICD-10-CM

## 2025-08-06 DIAGNOSIS — R06.09 DOE (DYSPNEA ON EXERTION): ICD-10-CM

## 2025-08-06 PROBLEM — Z71.2 ENCOUNTER TO DISCUSS TEST RESULTS: Status: RESOLVED | Noted: 2024-04-09 | Resolved: 2025-08-06

## 2025-08-06 PROCEDURE — 3078F DIAST BP <80 MM HG: CPT | Performed by: INTERNAL MEDICINE

## 2025-08-06 PROCEDURE — 99214 OFFICE O/P EST MOD 30 MIN: CPT | Performed by: INTERNAL MEDICINE

## 2025-08-06 PROCEDURE — 1036F TOBACCO NON-USER: CPT | Performed by: INTERNAL MEDICINE

## 2025-08-06 PROCEDURE — 3074F SYST BP LT 130 MM HG: CPT | Performed by: INTERNAL MEDICINE

## 2025-08-06 PROCEDURE — 1160F RVW MEDS BY RX/DR IN RCRD: CPT | Performed by: INTERNAL MEDICINE

## 2025-08-06 PROCEDURE — 4010F ACE/ARB THERAPY RXD/TAKEN: CPT | Performed by: INTERNAL MEDICINE

## 2025-08-06 PROCEDURE — 1159F MED LIST DOCD IN RCRD: CPT | Performed by: INTERNAL MEDICINE

## 2025-08-06 ASSESSMENT — ENCOUNTER SYMPTOMS
DYSPNEA ON EXERTION: 1
SLEEP DISTURBANCES DUE TO BREATHING: 1

## 2025-08-06 NOTE — PROGRESS NOTES
"Chief Complaint   Patient presents with    Follow-up     6 month, hypertension       Subjective   Rico Raman is a 75 y.o. male     75-year-old gentleman returns for annual cardiovascular follow-up for continued primary assessment and prevention.  He underwent stress imaging January 2024 that was completely normal.  Most recent labs are reviewed revealing LDL of 69 triglycerides 330    We continue to follow and assess him for hypertension, hyperlipidemia, diabetes, obstructive sleep apnea, obesity, and for symptoms of shortness of breath and dyspnea.    He otherwise has bone-on-bone in both knees he is not able to exercise routinely other than on a bicycle 1 mile and we have counseled him that exercise would be in his best interest given his comorbidities.    His wife is concerned about arrhythmia given her own history.  He remains on appropriate GDMT for primary prevention; last lipid analysis as stated.  We do not have a recent HbA1c    Recommendations: Follow-up in 1 year with nurse practitioner, obtain 24-hour Holter monitor, continue primary prevention         Review of Systems   Cardiovascular:  Positive for dyspnea on exertion.   Respiratory:  Positive for sleep disturbances due to breathing.             Vitals:    08/06/25 0943   BP: 116/60   BP Location: Right arm   Patient Position: Sitting   Pulse: 84   Weight: 115 kg (253 lb)   Height: 1.765 m (5' 9.5\")        Objective   Physical Exam  Constitutional:       Appearance: Normal appearance.   HENT:      Nose: Nose normal.   Neck:      Vascular: No carotid bruit.     Cardiovascular:      Rate and Rhythm: Normal rate.      Pulses: Normal pulses.      Heart sounds: Normal heart sounds.   Pulmonary:      Effort: Pulmonary effort is normal.   Abdominal:      General: Bowel sounds are normal.      Palpations: Abdomen is soft.     Musculoskeletal:         General: Normal range of motion.      Cervical back: Normal range of motion.      Right lower leg: No " edema.      Left lower leg: No edema.     Skin:     General: Skin is warm and dry.     Neurological:      General: No focal deficit present.      Mental Status: He is alert.     Psychiatric:         Mood and Affect: Mood normal.         Behavior: Behavior normal.         Thought Content: Thought content normal.         Judgment: Judgment normal.         Allergies  Patient has no known allergies.     Current Medications  Current Outpatient Medications   Medication Instructions    aspirin 81 mg, Daily    atorvastatin (LIPITOR) 40 mg, Nightly    fluticasone (Flonase) 50 mcg/actuation nasal spray 2 sprays, Each Nostril, Daily    glipiZIDE (GLUCOTROL) 10 mg, 2 times daily before meals    insulin lispro (HumaLOG KwikPen Insulin) 200 unit/mL (3 mL) insulin pen pen 3 times daily (morning, midday, late afternoon)    Januvia 100 mg    Jardiance 25 mg, Daily    Lantus U-100 Insulin 70 Units, Every 24 hours    lisinopril 10 mg    metFORMIN (GLUCOPHAGE) 1,000 mg, 2 times daily (morning and late afternoon)    ribavirin (REBETOL ORAL) Take by mouth.    triamterene-hydrochlorothiazid (Dyazide) 37.5-25 mg capsule 1 capsule                        Assessment/Plan   1. GUEVARA (dyspnea on exertion)        2. Mixed hyperlipidemia        3. Essential hypertension        4. Type 2 diabetes mellitus without complication, with long-term current use of insulin        5. LIDIA (obstructive sleep apnea)        6. BMI 36.0-36.9,adult        7. Never smoked tobacco                 Scribe Attestation  By signing my name below, IDiana LPN Scribe   attest that this documentation has been prepared under the direction and in the presence of Clyde Dumont DO.     Provider Attestation - Scribe documentation    All medical record entries made by the Scribe were at my direction and personally dictated by me. I have reviewed the chart and agree that the record accurately reflects my personal performance of the history, physical exam, discussion  and plan.

## 2025-08-06 NOTE — LETTER
August 6, 2025     Felipe Mcclellan MD  [address]  [place] [address]    Patient: Rico Raman   YOB: 1950   Date of Visit: 8/6/2025       Dear Dr. Felipe Mcclellan MD:    Thank you for referring Rico Raman to me for evaluation. Below are my notes for this consultation.  If you have questions, please do not hesitate to call me. I look forward to following your patient along with you.       Sincerely,     Clyde Dumont, DO      CC: No Recipients  ______________________________________________________________________________________    Chief Complaint   Patient presents with   • Follow-up     6 month, hypertension       Subjective   Rico Raman is a 75 y.o. male     75-year-old gentleman returns for annual cardiovascular follow-up for continued primary assessment and prevention.  He underwent stress imaging January 2024 that was completely normal.  Most recent labs are reviewed revealing LDL of 69 triglycerides 330    We continue to follow and assess him for hypertension, hyperlipidemia, diabetes, obstructive sleep apnea, obesity, and for symptoms of shortness of breath and dyspnea.    He otherwise has bone-on-bone in both knees he is not able to exercise routinely other than on a bicycle 1 mile and we have counseled him that exercise would be in his best interest given his comorbidities.    His wife is concerned about arrhythmia given her own history.  He remains on appropriate GDMT for primary prevention; last lipid analysis as stated.  We do not have a recent HbA1c    Recommendations: Follow-up in 1 year with nurse practitioner, obtain 24-hour Holter monitor, continue primary prevention         Review of Systems   Cardiovascular:  Positive for dyspnea on exertion.   Respiratory:  Positive for sleep disturbances due to breathing.             Vitals:    08/06/25 0943   BP: 116/60   BP Location: Right arm   Patient Position: Sitting   Pulse: 84   Weight: 115 kg (253 lb)  "  Height: 1.765 m (5' 9.5\")        Objective   Physical Exam  Constitutional:       Appearance: Normal appearance.   HENT:      Nose: Nose normal.   Neck:      Vascular: No carotid bruit.     Cardiovascular:      Rate and Rhythm: Normal rate.      Pulses: Normal pulses.      Heart sounds: Normal heart sounds.   Pulmonary:      Effort: Pulmonary effort is normal.   Abdominal:      General: Bowel sounds are normal.      Palpations: Abdomen is soft.     Musculoskeletal:         General: Normal range of motion.      Cervical back: Normal range of motion.      Right lower leg: No edema.      Left lower leg: No edema.     Skin:     General: Skin is warm and dry.     Neurological:      General: No focal deficit present.      Mental Status: He is alert.     Psychiatric:         Mood and Affect: Mood normal.         Behavior: Behavior normal.         Thought Content: Thought content normal.         Judgment: Judgment normal.         Allergies  Patient has no known allergies.     Current Medications  Current Outpatient Medications   Medication Instructions   • aspirin 81 mg, Daily   • atorvastatin (LIPITOR) 40 mg, Nightly   • fluticasone (Flonase) 50 mcg/actuation nasal spray 2 sprays, Each Nostril, Daily   • glipiZIDE (GLUCOTROL) 10 mg, 2 times daily before meals   • insulin lispro (HumaLOG KwikPen Insulin) 200 unit/mL (3 mL) insulin pen pen 3 times daily (morning, midday, late afternoon)   • Januvia 100 mg   • Jardiance 25 mg, Daily   • Lantus U-100 Insulin 70 Units, Every 24 hours   • lisinopril 10 mg   • metFORMIN (GLUCOPHAGE) 1,000 mg, 2 times daily (morning and late afternoon)   • ribavirin (REBETOL ORAL) Take by mouth.   • triamterene-hydrochlorothiazid (Dyazide) 37.5-25 mg capsule 1 capsule                        Assessment/Plan   1. GUEVARA (dyspnea on exertion)        2. Mixed hyperlipidemia        3. Essential hypertension        4. Type 2 diabetes mellitus without complication, with long-term current use of insulin   "      5. LIDIA (obstructive sleep apnea)        6. BMI 36.0-36.9,adult        7. Never smoked tobacco                 Scribe Attestation  By signing my name below, I, Diana MAURICIO LPN  , Scribe   attest that this documentation has been prepared under the direction and in the presence of Clyde Dumont DO.     Provider Attestation - Scribe documentation    All medical record entries made by the Scribe were at my direction and personally dictated by me. I have reviewed the chart and agree that the record accurately reflects my personal performance of the history, physical exam, discussion and plan.

## 2025-08-06 NOTE — PATIENT INSTRUCTIONS
Please bring all medicines, vitamins, and herbal supplements with you when you come to the office.    Prescriptions will not be filled unless you are compliant with your follow up appointments or have a follow up appointment scheduled as per instruction of your physician. Refills should be requested at the time of your visit.     BMI was above normal measurement. Current weight: 115 kg (253 lb)  Weight change since last visit (-) denotes wt loss 6 lbs   Weight loss needed to achieve BMI 25: 81.6 Lbs  Weight loss needed to achieve BMI 30: 47.3 Lbs  Provided instructions on dietary changes  Provided instructions on exercise.

## 2025-08-13 ENCOUNTER — APPOINTMENT (OUTPATIENT)
Dept: CARDIOLOGY | Facility: CLINIC | Age: 75
End: 2025-08-13
Payer: MEDICARE

## 2025-08-13 DIAGNOSIS — R06.09 DOE (DYSPNEA ON EXERTION): ICD-10-CM

## 2025-08-17 PROCEDURE — 93227 XTRNL ECG REC<48 HR R&I: CPT | Performed by: INTERNAL MEDICINE

## 2025-08-18 ENCOUNTER — RESULTS FOLLOW-UP (OUTPATIENT)
Dept: CARDIOLOGY | Facility: CLINIC | Age: 75
End: 2025-08-18
Payer: MEDICARE

## 2025-08-28 ENCOUNTER — TELEPHONE (OUTPATIENT)
Dept: ALLERGY | Facility: CLINIC | Age: 75
End: 2025-08-28
Payer: MEDICARE

## 2025-10-08 ENCOUNTER — APPOINTMENT (OUTPATIENT)
Dept: CARDIOLOGY | Facility: CLINIC | Age: 75
End: 2025-10-08
Payer: MEDICARE

## 2025-10-29 ENCOUNTER — APPOINTMENT (OUTPATIENT)
Dept: OTOLARYNGOLOGY | Facility: CLINIC | Age: 75
End: 2025-10-29
Payer: MEDICARE

## 2026-08-06 ENCOUNTER — APPOINTMENT (OUTPATIENT)
Dept: CARDIOLOGY | Facility: CLINIC | Age: 76
End: 2026-08-06
Payer: MEDICARE